# Patient Record
Sex: MALE | Race: OTHER | NOT HISPANIC OR LATINO
[De-identification: names, ages, dates, MRNs, and addresses within clinical notes are randomized per-mention and may not be internally consistent; named-entity substitution may affect disease eponyms.]

---

## 2019-04-05 ENCOUNTER — APPOINTMENT (OUTPATIENT)
Dept: ORTHOPEDIC SURGERY | Facility: CLINIC | Age: 74
End: 2019-04-05
Payer: MEDICARE

## 2019-04-05 ENCOUNTER — RECORD ABSTRACTING (OUTPATIENT)
Age: 74
End: 2019-04-05

## 2019-04-05 VITALS — BODY MASS INDEX: 25.76 KG/M2 | WEIGHT: 170 LBS | HEIGHT: 68 IN

## 2019-04-05 DIAGNOSIS — Z78.9 OTHER SPECIFIED HEALTH STATUS: ICD-10-CM

## 2019-04-05 DIAGNOSIS — Z87.39 PERSONAL HISTORY OF OTHER DISEASES OF THE MUSCULOSKELETAL SYSTEM AND CONNECTIVE TISSUE: ICD-10-CM

## 2019-04-05 DIAGNOSIS — M51.36 OTHER INTERVERTEBRAL DISC DEGENERATION, LUMBAR REGION: ICD-10-CM

## 2019-04-05 DIAGNOSIS — Z87.442 PERSONAL HISTORY OF URINARY CALCULI: ICD-10-CM

## 2019-04-05 DIAGNOSIS — M62.50 MUSCLE WASTING AND ATROPHY, NOT ELSEWHERE CLASSIFIED, UNSPECIFIED SITE: ICD-10-CM

## 2019-04-05 DIAGNOSIS — M19.91 PRIMARY OSTEOARTHRITIS, UNSPECIFIED SITE: ICD-10-CM

## 2019-04-05 DIAGNOSIS — M23.305 OTHER MENISCUS DERANGEMENTS, UNSPECIFIED MEDIAL MENISCUS, UNSPECIFIED KNEE: ICD-10-CM

## 2019-04-05 DIAGNOSIS — H91.90 UNSPECIFIED HEARING LOSS, UNSPECIFIED EAR: ICD-10-CM

## 2019-04-05 DIAGNOSIS — Z87.19 PERSONAL HISTORY OF OTHER DISEASES OF THE DIGESTIVE SYSTEM: ICD-10-CM

## 2019-04-05 DIAGNOSIS — M51.26 OTHER INTERVERTEBRAL DISC DEGENERATION, LUMBAR REGION: ICD-10-CM

## 2019-04-05 DIAGNOSIS — M22.40 CHONDROMALACIA PATELLAE, UNSPECIFIED KNEE: ICD-10-CM

## 2019-04-05 DIAGNOSIS — Z82.49 FAMILY HISTORY OF ISCHEMIC HEART DISEASE AND OTHER DISEASES OF THE CIRCULATORY SYSTEM: ICD-10-CM

## 2019-04-05 DIAGNOSIS — M75.50 BURSITIS OF UNSPECIFIED SHOULDER: ICD-10-CM

## 2019-04-05 DIAGNOSIS — M17.10 UNILATERAL PRIMARY OSTEOARTHRITIS, UNSPECIFIED KNEE: ICD-10-CM

## 2019-04-05 DIAGNOSIS — M65.30 TRIGGER FINGER, UNSPECIFIED FINGER: ICD-10-CM

## 2019-04-05 PROBLEM — Z00.00 ENCOUNTER FOR PREVENTIVE HEALTH EXAMINATION: Status: ACTIVE | Noted: 2019-04-05

## 2019-04-05 PROCEDURE — 99213 OFFICE O/P EST LOW 20 MIN: CPT | Mod: 25

## 2019-04-05 PROCEDURE — 20611 DRAIN/INJ JOINT/BURSA W/US: CPT | Mod: RT

## 2019-04-05 RX ORDER — ACETAMINOPHEN 325 MG/1
325 TABLET, FILM COATED ORAL EVERY 6 HOURS
Refills: 0 | Status: ACTIVE | COMMUNITY

## 2019-04-05 RX ORDER — HYLAN G-F 20 16MG/2ML
48 SYRINGE (ML) INTRAARTICULAR
Refills: 0 | Status: ACTIVE | COMMUNITY

## 2019-04-05 RX ORDER — HYLAN G-F 20 16MG/2ML
16 SYRINGE (ML) INTRAARTICULAR
Refills: 0 | Status: ACTIVE | COMMUNITY

## 2019-04-05 RX ORDER — ATORVASTATIN CALCIUM 80 MG/1
TABLET, FILM COATED ORAL
Refills: 0 | Status: ACTIVE | COMMUNITY

## 2019-04-05 RX ORDER — IBUPROFEN 800 MG/1
TABLET, FILM COATED ORAL
Refills: 0 | Status: ACTIVE | COMMUNITY

## 2019-04-05 RX ORDER — ALLOPURINOL 200 MG/1
TABLET ORAL
Refills: 0 | Status: ACTIVE | COMMUNITY

## 2019-04-05 RX ORDER — OMEPRAZOLE 20 MG/1
TABLET, DELAYED RELEASE ORAL
Refills: 0 | Status: ACTIVE | COMMUNITY

## 2019-04-05 RX ORDER — TRIAMCINOLONE ACETONIDE 10 MG/ML
10 INJECTION, SUSPENSION INTRA-ARTICULAR; INTRALESIONAL
Refills: 0 | Status: ACTIVE | COMMUNITY

## 2019-04-05 RX ORDER — RIVASTIGMINE 13.3 MG/24H
13.3 PATCH, EXTENDED RELEASE TRANSDERMAL
Refills: 0 | Status: ACTIVE | COMMUNITY

## 2019-05-06 ENCOUNTER — APPOINTMENT (OUTPATIENT)
Dept: ORTHOPEDIC SURGERY | Facility: CLINIC | Age: 74
End: 2019-05-06
Payer: MEDICARE

## 2019-05-06 VITALS — BODY MASS INDEX: 25.76 KG/M2 | WEIGHT: 170 LBS | HEIGHT: 68 IN

## 2019-05-06 DIAGNOSIS — M54.5 LOW BACK PAIN: ICD-10-CM

## 2019-05-06 PROCEDURE — 99214 OFFICE O/P EST MOD 30 MIN: CPT

## 2019-05-06 PROCEDURE — 72170 X-RAY EXAM OF PELVIS: CPT

## 2019-05-06 PROCEDURE — 72100 X-RAY EXAM L-S SPINE 2/3 VWS: CPT

## 2019-05-06 RX ORDER — METHYLPREDNISOLONE 4 MG/1
4 TABLET ORAL
Qty: 1 | Refills: 0 | Status: ACTIVE | COMMUNITY
Start: 2019-05-06 | End: 1900-01-01

## 2019-05-06 NOTE — DISCUSSION/SUMMARY
[de-identified] : Patient placed on Medrol Dosepak. He does not improve he will call me and we'll be referred to a pain management specialist for lumbar issues.

## 2019-05-13 ENCOUNTER — RX RENEWAL (OUTPATIENT)
Age: 74
End: 2019-05-13

## 2019-10-09 ENCOUNTER — APPOINTMENT (OUTPATIENT)
Dept: ORTHOPEDIC SURGERY | Facility: CLINIC | Age: 74
End: 2019-10-09
Payer: MEDICARE

## 2019-10-09 VITALS — BODY MASS INDEX: 25.76 KG/M2 | HEIGHT: 68 IN | WEIGHT: 170 LBS

## 2019-10-09 PROCEDURE — 99213 OFFICE O/P EST LOW 20 MIN: CPT | Mod: 25

## 2019-10-09 PROCEDURE — 20611 DRAIN/INJ JOINT/BURSA W/US: CPT | Mod: LT

## 2019-10-09 NOTE — DISCUSSION/SUMMARY
[de-identified] : POST INJECTION INSTRUCTIONS\par \par COLD THERAPY , ANALGESICS PRN\par \par HOME STRETCHING AND EXERCISES QD\par \par START P.T.  2 WEEKS AFTER INJECTION \par \par MRI IF NO RELIEF\par

## 2019-10-09 NOTE — PROCEDURE
[de-identified] : INJECTION LEFT SHOULDER SA SPACE\par \par Patient has demonstrated limited relief from NSAIDS, rest, exercises / PT, and after discussion of the risks and benefits, the patient has elected to proceed with an ULTRASOUND GUIDED injection into the LEFT SUBACROMIAL  SPACE LATERAL APPROACH \par  \par Confirmed that the patient does not have history of prior adverse reactions, active, infections, or relevant allergies. There was no effusion, erythema, or warmth, and the skin was clear\par \par The skin was sterilized with alcohol. Ethyl Chloride was used as a topical anesthetic. Routine sterile technique. \par The site was injected UTILIZING ULTRASOUND GUIDANCE to confirm appropriate placement of the needle-\par with a mixture of medication and local anesthetic. The injection was completed without complication and a bandage was applied.\par  \par The patient tolerated the procedure well and was given post-injection instructions.Rec: Cold therapy, analgesics, avoid heavy activity.\par MEDICATION: 4cc of 1% xylocaine + 10mg of KENALOG\par \par

## 2019-10-09 NOTE — PHYSICAL EXAM
[de-identified] : PHYSICAL EXAM LEFT  SHOULDER\par \par NORMAL POSTURE \par AROM 120 / 120 / 70 / 20 \par TENDER: SA REGION \par \par SPECIAL TESTING :\par FREIRE - POSITIVE \par AMPARO - POSITIVE \par SPEED TEST - POSITIVE\par \par HIDALGO - NEGATIVE \par APPREHENSION AND SUPPRESSION - NEGATIVE \par \par RC STRENGTH TESTING \par SS:  5/5\par SUB 5/5\par IS     5/5\par BICEPS  5/5\par \par SENSATION  - GROSSLY INTACT\par \par \par

## 2019-10-09 NOTE — HISTORY OF PRESENT ILLNESS
[de-identified] : LEFT SHOULDER FOLLOW UP\par GOOD RELIEF FROM INJECTION JUNE 2018 \par PAIN LEVEL 4-5/10\par CONSTANT\par ACHY\par WORSE WITH LYING DOWN, NIGHT TIME\par BETTER WITH TYLENOL\par GRINDING\par APRIL 5, 2019- CORTISONE INJECTION HELPED FOR 4-5 MONTHS

## 2019-11-18 ENCOUNTER — APPOINTMENT (OUTPATIENT)
Dept: ORTHOPEDIC SURGERY | Facility: CLINIC | Age: 74
End: 2019-11-18
Payer: MEDICARE

## 2019-11-18 VITALS — HEIGHT: 68 IN | BODY MASS INDEX: 25.76 KG/M2 | WEIGHT: 170 LBS

## 2019-11-18 PROCEDURE — 99214 OFFICE O/P EST MOD 30 MIN: CPT | Mod: 25

## 2019-11-18 PROCEDURE — 20611 DRAIN/INJ JOINT/BURSA W/US: CPT | Mod: LT

## 2019-11-18 NOTE — HISTORY OF PRESENT ILLNESS
[de-identified] : LEFT SHOULDER FOLLOW UP\par SAME AS LAST VISIT\par PAIN LEVEL 5-6/10\par WORSE WITH LYING DOWN, NIGHT TIME\par P.T. 1X WEEK FOR 5 WEEKS, NOT HELPING \par APRIL 5, 2019- CORTISONE INJECTION HELPED FOR 4-5 MONTHS\par OCTOBER 9, 2019- CORTISONE INJECTION NOT HELPFUL

## 2019-11-18 NOTE — PROCEDURE
[de-identified] : INJECTION LEFT SHOULDER SA SPACE\par \par Patient has demonstrated limited relief from NSAIDS, rest, exercises / PT, and after discussion of the risks and benefits, the patient has elected to proceed with an ULTRASOUND GUIDED injection into the LEFT SUBACROMIAL  SPACE LATERAL APPROACH \par  \par Confirmed that the patient does not have history of prior adverse reactions, active, infections, or relevant allergies. There was no effusion, erythema, or warmth, and the skin was clear\par \par The skin was sterilized with alcohol. Ethyl Chloride was used as a topical anesthetic. Routine sterile technique. \par The site was injected UTILIZING ULTRASOUND GUIDANCE to confirm appropriate placement of the needle-\par with a mixture of medication and local anesthetic. The injection was completed without complication and a bandage was applied.\par  \par The patient tolerated the procedure well and was given post-injection instructions.Rec: Cold therapy, analgesics, avoid heavy activity.\par MEDICATION: 4cc of 1% xylocaine + 40mg of KENALOG\par \par

## 2019-11-18 NOTE — DISCUSSION/SUMMARY
[de-identified] : POST INJECTION INSTRUCTIONS\par \par COLD THERAPY , ANALGESICS PRN\par \par HOME STRETCHING AND EXERCISES QD\par \par FOLLOWUP AFTER MRI\par

## 2019-11-18 NOTE — PHYSICAL EXAM
[de-identified] : PHYSICAL EXAM LEFT  SHOULDER\par \par NORMAL POSTURE \par AROM 120 / 120 / 70 / 20 \par TENDER: SA REGION \par \par SPECIAL TESTING :\par FREIRE - POSITIVE \par AMPARO - POSITIVE \par SPEED TEST - POSITIVE\par \par HIDALGO - NEGATIVE \par APPREHENSION AND SUPPRESSION - NEGATIVE \par \par RC STRENGTH TESTING \par SS:  5/5\par SUB 5/5\par IS     5/5\par BICEPS  5/5\par \par SENSATION  - GROSSLY INTACT\par \par \par

## 2020-01-15 ENCOUNTER — APPOINTMENT (OUTPATIENT)
Dept: ORTHOPEDIC SURGERY | Facility: CLINIC | Age: 75
End: 2020-01-15
Payer: MEDICARE

## 2020-01-15 VITALS — BODY MASS INDEX: 25.76 KG/M2 | WEIGHT: 170 LBS | HEIGHT: 68 IN

## 2020-01-15 DIAGNOSIS — M75.111 INCOMPLETE ROTATOR CUFF TEAR OR RUPTURE OF RIGHT SHOULDER, NOT SPECIFIED AS TRAUMATIC: ICD-10-CM

## 2020-01-15 PROCEDURE — 99213 OFFICE O/P EST LOW 20 MIN: CPT

## 2020-01-15 NOTE — PHYSICAL EXAM
[de-identified] : PHYSICAL EXAM LEFT  SHOULDER\par \par NORMAL POSTURE / SCAPULAR PROTRACTION\par AROM 130 / 130 / 70 / 20 \par TENDER: SA REGION / AC JOINT / GH JOINT / BICEPS GROOVE\par \par SPECIAL TESTING :\par FREIRE - POSITIVE \par AMPARO - POSITIVE \par SPEED TEST - POSITIVE\par \par HIDALGO - NEGATIVE \par APPREHENSION AND SUPPRESSION - NEGATIVE \par \par RC STRENGTH TESTING \par SS:  5/5\par SUB 5/5\par IS     5/5\par BICEPS  5/5\par \par SENSATION  - GROSSLY INTACT\par \par \par

## 2020-01-15 NOTE — PHYSICAL EXAM
[de-identified] : PHYSICAL EXAM LEFT  SHOULDER\par \par NORMAL POSTURE \par AROM 120 / 120 / 70 / 20 \par TENDER: SA REGION \par \par SPECIAL TESTING :\par FREIRE - POSITIVE \par AMPARO - POSITIVE \par SPEED TEST - POSITIVE\par \par HIDALGO - NEGATIVE \par APPREHENSION AND SUPPRESSION - NEGATIVE \par \par RC STRENGTH TESTING \par SS:  5/5\par SUB 5/5\par IS     5/5\par BICEPS  5/5\par \par SENSATION  - GROSSLY INTACT\par \par \par

## 2020-01-15 NOTE — DISCUSSION/SUMMARY
[de-identified] : PREOP SHOULDER SURGERY DISCUSSION:\par \par \par THERE ARE NO GUARANTEES THAT ALL SYMPTOMS WILL BE ALLEVIATED  \par SHOULDER ARTHROSCOPY, ACROMIOPLASTY, DEBRIDEMENT,  RC REPAIR AND LABRUM REPAIRS- ON AVERAGE 75- 85% SATISFACTORY RESULTS FOR TEARS < 3CM AFTER 9-12 MONTHS HEALING AND REHABILITATION. \par \par REPAIRS WILL REQUIRE STRICT SHOULDER IMMOBILIZER 4-6 WEEKS\par \par RC TEARS 3CM OR LARGER MAY REQUIRE COLLAGEN PATCH AUGMENTATION GENERALLY HAVE LESS SATISFACTORY RESULTS\par \par PHYSICAL THERAPY REQUIRED 2X WEEK FOR  MINIMUM 8-12 WEEKS FOR ALL PROCEDURES \par CONTINUED HOME EXERCISES 6-9 MONTHS AFTER THAT REQUIRED FOR OPTIMAL OUTCOMES \par \par ROUTINE SURGICAL AND ANESTHETIC RISKS INCLUDE RISK OF SURGICAL INFECTION, ANESTHETIC COMPLICATION OR ALLERGY, POSSIBLE RETEARS OR PROGRESSION OF TEAR, STIFFNESS OF SHOULDER AND UNSATISFACTORY OUTCOMES\par \par PATIENT UNDERSTANDS AND WISHES TO PROCEED\par

## 2020-01-15 NOTE — HISTORY OF PRESENT ILLNESS
[Pain Location] : pain [] : left shoulder [6] : a current pain level of 6/10 [Constant] : ~He/She~ states the symptoms seem to be constant [Lifting] : worsened by lifting [Rest] : relieved by rest [de-identified] : LEFT SHOULDER FOLLOW UP\par GOOD RELIEF FROM INJECTION JUNE 2018 \par PAIN LEVEL 6/10\par CONSTANT\par ACHY\par WORSE WITH LYING DOWN, NIGHT TIME\par BETTER WITH TYLENOL\par GRINDING\par PRIOR CORTISONE INJECTION HELPED\par NO PT

## 2020-01-15 NOTE — HISTORY OF PRESENT ILLNESS
[de-identified] : LEFT SHOULDER\par FOLLOW UP- MRI RESULTS\par CONSTANT PAIN\par PAIN LEVEL 5-6/10\par WORSE WITH LYING DOWN, NIGHT TIME\par P.T. 1X WEEK FOR 5 WEEKS\par APRIL 5, 2019- CORTISONE INJECTION HELPED FOR 4-5 MONTHS\par OCTOBER 9, 2019- CORTISONE INJECTION NOT HELPFUL\par NOVEMBER 18, /2019- CORTISONE INJECTION HELPFUL FOR 4-5 WEEKS

## 2020-01-30 ENCOUNTER — APPOINTMENT (OUTPATIENT)
Dept: ORTHOPEDIC SURGERY | Facility: CLINIC | Age: 75
End: 2020-01-30
Payer: MEDICARE

## 2020-01-30 PROCEDURE — 99214 OFFICE O/P EST MOD 30 MIN: CPT

## 2020-01-30 RX ORDER — OXYCODONE AND ACETAMINOPHEN 7.5; 325 MG/1; MG/1
7.5-325 TABLET ORAL
Qty: 42 | Refills: 0 | Status: ACTIVE | COMMUNITY
Start: 2020-01-30 | End: 1900-01-01

## 2020-01-30 NOTE — PHYSICAL EXAM
[de-identified] : PHYSICAL EXAM LEFT  SHOULDER\par \par NORMAL POSTURE \par AROM 120 / 120 / 70 / 20 \par TENDER: SA REGION \par \par SPECIAL TESTING :\par FREIRE - POSITIVE \par AMPARO - POSITIVE \par SPEED TEST - POSITIVE\par \par HIDALGO - NEGATIVE \par APPREHENSION AND SUPPRESSION - NEGATIVE \par \par RC STRENGTH TESTING \par SS:  5/5\par SUB 5/5\par IS     5/5\par BICEPS  5/5\par \par SENSATION  - GROSSLY INTACT\par \par \par

## 2020-01-30 NOTE — DISCUSSION/SUMMARY
[de-identified] : PREOP SHOULDER SURGERY DISCUSSION:\par \par \par THERE ARE NO GUARANTEES THAT ALL SYMPTOMS WILL BE ALLEVIATED  \par SHOULDER ARTHROSCOPY, ACROMIOPLASTY, DEBRIDEMENT,  RC REPAIR AND LABRUM REPAIRS- ON AVERAGE 75- 85% SATISFACTORY RESULTS FOR TEARS < 3CM AFTER 9-12 MONTHS HEALING AND REHABILITATION. \par \par REPAIRS WILL REQUIRE STRICT SHOULDER IMMOBILIZER 4-6 WEEKS\par \par RC TEARS 3CM OR LARGER MAY REQUIRE COLLAGEN PATCH AUGMENTATION GENERALLY HAVE LESS SATISFACTORY RESULTS\par \par PHYSICAL THERAPY REQUIRED 2X WEEK FOR  MINIMUM 8-12 WEEKS FOR ALL PROCEDURES \par CONTINUED HOME EXERCISES 6-9 MONTHS AFTER THAT REQUIRED FOR OPTIMAL OUTCOMES \par \par ROUTINE SURGICAL AND ANESTHETIC RISKS INCLUDE RISK OF SURGICAL INFECTION, ANESTHETIC COMPLICATION OR ALLERGY, POSSIBLE RETEARS OR PROGRESSION OF TEAR, STIFFNESS OF SHOULDER AND UNSATISFACTORY OUTCOMES\par \par PATIENT UNDERSTANDS AND WISHES TO PROCEED\par

## 2020-02-04 ENCOUNTER — APPOINTMENT (OUTPATIENT)
Dept: ORTHOPEDIC SURGERY | Facility: AMBULATORY SURGERY CENTER | Age: 75
End: 2020-02-04
Payer: MEDICARE

## 2020-02-04 PROCEDURE — 29826 SHO ARTHRS SRG DECOMPRESSION: CPT | Mod: LT

## 2020-02-04 PROCEDURE — 29823 SHO ARTHRS SRG XTNSV DBRDMT: CPT | Mod: LT,59

## 2020-02-04 PROCEDURE — 29820 SHO ARTHRS SRG PRTL SYNVCT: CPT | Mod: LT,59

## 2020-02-07 ENCOUNTER — APPOINTMENT (OUTPATIENT)
Dept: ORTHOPEDIC SURGERY | Facility: CLINIC | Age: 75
End: 2020-02-07
Payer: MEDICARE

## 2020-02-07 VITALS — HEIGHT: 68 IN | WEIGHT: 170 LBS | BODY MASS INDEX: 25.76 KG/M2

## 2020-02-07 PROCEDURE — 73030 X-RAY EXAM OF SHOULDER: CPT | Mod: LT

## 2020-02-07 PROCEDURE — 99024 POSTOP FOLLOW-UP VISIT: CPT

## 2020-02-07 RX ORDER — IBUPROFEN 800 MG/1
800 TABLET ORAL 3 TIMES DAILY
Qty: 90 | Refills: 5 | Status: ACTIVE | COMMUNITY
Start: 2020-02-07 | End: 1900-01-01

## 2020-02-07 NOTE — PHYSICAL EXAM
[de-identified] : POST OP SHOULDER EXAM \par \par PORTALS HEALING WELL - NO ERYTHEMA OR CALOR\par SUTURES REMOVED, STERISTRIPS AND WATERPROOF BANDAIDS  APPLIED \par \par AROM  pend aarom \par \par DISTAL CMS INTACT\par

## 2020-02-07 NOTE — DISCUSSION/SUMMARY
[de-identified] : POST OP REPAIR:\par FEBRUARY 4, 2020 - LEFT SUBSCAP TRANSTENDIOUS REPAIR , KAILASH, BICEPS 20%, OA\par \par COLD THERAPY,ANALGESICS AS NEEDED\par \par SHOULDER IMMOBILIZED FULL TIME X 6 WEEKS - STRICT NO AROM - DESKTOP WORK ALLOWED\par \par START PENDULUM EXERCISES 1 WEEK POSTOP\par \par START AAROM FLEXION, PULLEY  3 WEEKS POST OP\par \par FU 3 WEEKS - ADVANCE TO P.T.

## 2020-02-07 NOTE — HISTORY OF PRESENT ILLNESS
[de-identified] : LEFT SHOULDER\par POST OP\par 3 DAYS\par FEBRUARY 4, 2020 - LEFT SUBSCAP TRANSTENDIOUS REPAIR , KAILASH, BICEPS 20%, OA\par PAIN LEVEL 6-7/10\par NO NUMBNESS OR TINGLING\par GENERAL WEAKNESS AND SWELLING

## 2020-02-26 ENCOUNTER — APPOINTMENT (OUTPATIENT)
Dept: ORTHOPEDIC SURGERY | Facility: CLINIC | Age: 75
End: 2020-02-26
Payer: MEDICARE

## 2020-02-26 PROCEDURE — 99024 POSTOP FOLLOW-UP VISIT: CPT

## 2020-02-26 NOTE — HISTORY OF PRESENT ILLNESS
[de-identified] : LEFT SHOULDER\par POST OP- 3 WEEKS\par FEBRUARY 4, 2020 - LEFT SUBSCAP TRANSTENDIOUS REPAIR , KAILASH, BICEPS 20%, OA\par PAIN LEVEL 8/10\par

## 2020-02-26 NOTE — PHYSICAL EXAM
[de-identified] : POST OP SHOULDER EXAM \par \par PORTALS HEALING WELL - NO ERYTHEMA OR CALOR\par \par \par AROM  \par LEFT 120 / 120 \par \par DISTAL CMS INTACT\par

## 2020-07-08 ENCOUNTER — APPOINTMENT (OUTPATIENT)
Dept: ORTHOPEDIC SURGERY | Facility: CLINIC | Age: 75
End: 2020-07-08
Payer: MEDICARE

## 2020-07-08 VITALS — BODY MASS INDEX: 25.76 KG/M2 | HEIGHT: 68 IN | WEIGHT: 170 LBS

## 2020-07-08 VITALS — TEMPERATURE: 97.9 F

## 2020-07-08 PROCEDURE — 99213 OFFICE O/P EST LOW 20 MIN: CPT

## 2020-07-08 NOTE — PHYSICAL EXAM
[de-identified] : POST OP SHOULDER EXAM \par \par PORTALS HEALING WELL - NO ERYTHEMA OR CALOR\par \par \par AROM  \par LEFT 140 / 125 / 80 / 30 \par \par DISTAL CMS INTACT\par

## 2020-09-09 ENCOUNTER — APPOINTMENT (OUTPATIENT)
Dept: ORTHOPEDIC SURGERY | Facility: CLINIC | Age: 75
End: 2020-09-09
Payer: MEDICARE

## 2020-09-09 VITALS — TEMPERATURE: 96.3 F

## 2020-09-09 PROCEDURE — 99213 OFFICE O/P EST LOW 20 MIN: CPT

## 2020-09-09 NOTE — HISTORY OF PRESENT ILLNESS
[de-identified] : LEFT SHOULDER\par FEBRUARY 4, 2020 - LEFT SUBSCAP TRANSTENDIOUS REPAIR , KAILASH, BICEPS 20%, OA\par GENERALLY NO PAIN\par RESTARTED PT JULY (STOPPED DU TO COVID)  \par OVERALL IMPROVING\par \par

## 2020-09-09 NOTE — DISCUSSION/SUMMARY
[de-identified] : COMPLETED PT\par HOME EXERERCISES WITH BANDS AND SMALL WEIGHTS\par RETURNED TO [employer] AS TOLERATED \par \par OFFICE PRN

## 2020-09-09 NOTE — PHYSICAL EXAM
[de-identified] : POST OP SHOULDER EXAM \par \par PORTALS HEALING WELL - NO ERYTHEMA OR CALOR\par \par \par AROM  \par LEFT 150 / 150 / 90 / 30\par 5/5\par DISTAL CMS INTACT\par

## 2020-12-21 ENCOUNTER — APPOINTMENT (OUTPATIENT)
Dept: ORTHOPEDIC SURGERY | Facility: CLINIC | Age: 75
End: 2020-12-21
Payer: MEDICARE

## 2020-12-21 PROCEDURE — 73562 X-RAY EXAM OF KNEE 3: CPT | Mod: RT

## 2020-12-21 PROCEDURE — 99214 OFFICE O/P EST MOD 30 MIN: CPT | Mod: 25

## 2020-12-21 PROCEDURE — 20611 DRAIN/INJ JOINT/BURSA W/US: CPT | Mod: RT

## 2020-12-21 PROCEDURE — 99072 ADDL SUPL MATRL&STAF TM PHE: CPT

## 2020-12-21 NOTE — PHYSICAL EXAM
[de-identified] : Left knee on exam today range of motion 0-100° a small effusion mild warmth good quad tone no evidence of instability neurovascularly intact distally. [de-identified] : AP standing individual and sunrise views of the right knee were obtained showing modest diminishment of medial joint space on standing AP projection.

## 2020-12-21 NOTE — REASON FOR VISIT
[Follow-Up Visit] : a follow-up visit for [FreeTextEntry2] : RIGHT KNEE PAIN AND SWELLING - SENT FOR NEW XRAYS

## 2020-12-21 NOTE — HISTORY OF PRESENT ILLNESS
[de-identified] : He is here for a 3 week history of right knee pain. He was injury but notices pain especially with stair climbing getting a seated position. He is status post left knee replacement 5-6 years ago doing well with that.

## 2020-12-21 NOTE — PROCEDURE
[de-identified] : Patient was given a cortisone injection today this is done under sterile conditions an ultrasound guidance the lateral joint line. Patient tolerated the procedure well.

## 2020-12-21 NOTE — DISCUSSION/SUMMARY
[de-identified] : We talked about options patient also in addition to cortisone injection taking over-the-counter anti-inflammatories and ice this twice a day for the following week. If symptoms persist she will return that point may consider an MRI and before the injections.

## 2021-02-16 ENCOUNTER — NON-APPOINTMENT (OUTPATIENT)
Age: 76
End: 2021-02-16

## 2021-02-18 ENCOUNTER — NON-APPOINTMENT (OUTPATIENT)
Age: 76
End: 2021-02-18

## 2021-02-18 ENCOUNTER — APPOINTMENT (OUTPATIENT)
Dept: OPHTHALMOLOGY | Facility: CLINIC | Age: 76
End: 2021-02-18
Payer: MEDICARE

## 2021-02-18 PROCEDURE — 92004 COMPRE OPH EXAM NEW PT 1/>: CPT

## 2021-02-18 PROCEDURE — 92020 GONIOSCOPY: CPT

## 2021-02-18 PROCEDURE — 92250 FUNDUS PHOTOGRAPHY W/I&R: CPT

## 2021-02-18 PROCEDURE — 92136 OPHTHALMIC BIOMETRY: CPT

## 2021-02-21 ENCOUNTER — NON-APPOINTMENT (OUTPATIENT)
Age: 76
End: 2021-02-21

## 2021-02-22 ENCOUNTER — NON-APPOINTMENT (OUTPATIENT)
Age: 76
End: 2021-02-22

## 2021-02-22 ENCOUNTER — APPOINTMENT (OUTPATIENT)
Dept: OPHTHALMOLOGY | Facility: CLINIC | Age: 76
End: 2021-02-22
Payer: MEDICARE

## 2021-02-22 PROCEDURE — 92201 OPSCPY EXTND RTA DRAW UNI/BI: CPT

## 2021-02-22 PROCEDURE — 92014 COMPRE OPH EXAM EST PT 1/>: CPT

## 2021-02-22 PROCEDURE — 92134 CPTRZ OPH DX IMG PST SGM RTA: CPT

## 2021-03-25 ENCOUNTER — APPOINTMENT (OUTPATIENT)
Dept: OPHTHALMOLOGY | Facility: CLINIC | Age: 76
End: 2021-03-25
Payer: MEDICARE

## 2021-03-25 ENCOUNTER — NON-APPOINTMENT (OUTPATIENT)
Age: 76
End: 2021-03-25

## 2021-03-25 PROCEDURE — 92083 EXTENDED VISUAL FIELD XM: CPT

## 2021-03-25 PROCEDURE — 92133 CPTRZD OPH DX IMG PST SGM ON: CPT

## 2021-03-25 PROCEDURE — 99213 OFFICE O/P EST LOW 20 MIN: CPT

## 2021-05-03 ENCOUNTER — TRANSCRIPTION ENCOUNTER (OUTPATIENT)
Age: 76
End: 2021-05-03

## 2021-05-04 ENCOUNTER — OUTPATIENT (OUTPATIENT)
Dept: OUTPATIENT SERVICES | Facility: HOSPITAL | Age: 76
LOS: 1 days | Discharge: HOME | End: 2021-05-04
Payer: MEDICARE

## 2021-05-04 ENCOUNTER — APPOINTMENT (OUTPATIENT)
Dept: OPHTHALMOLOGY | Facility: AMBULATORY SURGERY CENTER | Age: 76
End: 2021-05-04

## 2021-05-04 DIAGNOSIS — Z41.9 ENCOUNTER FOR PROCEDURE FOR PURPOSES OTHER THAN REMEDYING HEALTH STATE, UNSPECIFIED: Chronic | ICD-10-CM

## 2021-05-04 DIAGNOSIS — Z98.89 OTHER SPECIFIED POSTPROCEDURAL STATES: Chronic | ICD-10-CM

## 2021-05-04 PROCEDURE — 66984 XCAPSL CTRC RMVL W/O ECP: CPT | Mod: LT

## 2021-05-05 ENCOUNTER — APPOINTMENT (OUTPATIENT)
Dept: OPHTHALMOLOGY | Facility: CLINIC | Age: 76
End: 2021-05-05
Payer: MEDICARE

## 2021-05-05 ENCOUNTER — NON-APPOINTMENT (OUTPATIENT)
Age: 76
End: 2021-05-05

## 2021-05-05 PROCEDURE — 99024 POSTOP FOLLOW-UP VISIT: CPT

## 2021-05-13 ENCOUNTER — APPOINTMENT (OUTPATIENT)
Dept: OPHTHALMOLOGY | Facility: CLINIC | Age: 76
End: 2021-05-13
Payer: MEDICARE

## 2021-05-13 ENCOUNTER — NON-APPOINTMENT (OUTPATIENT)
Age: 76
End: 2021-05-13

## 2021-05-13 PROCEDURE — 99024 POSTOP FOLLOW-UP VISIT: CPT

## 2021-07-15 ENCOUNTER — APPOINTMENT (OUTPATIENT)
Dept: OPHTHALMOLOGY | Facility: CLINIC | Age: 76
End: 2021-07-15
Payer: MEDICARE

## 2021-07-15 ENCOUNTER — NON-APPOINTMENT (OUTPATIENT)
Age: 76
End: 2021-07-15

## 2021-07-15 PROCEDURE — 99024 POSTOP FOLLOW-UP VISIT: CPT

## 2021-09-08 ENCOUNTER — NON-APPOINTMENT (OUTPATIENT)
Age: 76
End: 2021-09-08

## 2021-09-08 ENCOUNTER — APPOINTMENT (OUTPATIENT)
Dept: OPHTHALMOLOGY | Facility: CLINIC | Age: 76
End: 2021-09-08
Payer: MEDICARE

## 2021-09-08 PROCEDURE — 92012 INTRM OPH EXAM EST PATIENT: CPT

## 2021-09-22 ENCOUNTER — TRANSCRIPTION ENCOUNTER (OUTPATIENT)
Age: 76
End: 2021-09-22

## 2021-10-20 ENCOUNTER — APPOINTMENT (OUTPATIENT)
Dept: OPHTHALMOLOGY | Facility: CLINIC | Age: 76
End: 2021-10-20
Payer: MEDICARE

## 2021-10-20 ENCOUNTER — NON-APPOINTMENT (OUTPATIENT)
Age: 76
End: 2021-10-20

## 2021-10-20 PROCEDURE — 92083 EXTENDED VISUAL FIELD XM: CPT

## 2021-10-20 PROCEDURE — 92133 CPTRZD OPH DX IMG PST SGM ON: CPT

## 2021-10-20 PROCEDURE — 99213 OFFICE O/P EST LOW 20 MIN: CPT

## 2021-11-01 ENCOUNTER — TRANSCRIPTION ENCOUNTER (OUTPATIENT)
Age: 76
End: 2021-11-01

## 2021-11-02 ENCOUNTER — OUTPATIENT (OUTPATIENT)
Dept: OUTPATIENT SERVICES | Facility: HOSPITAL | Age: 76
LOS: 1 days | Discharge: HOME | End: 2021-11-02
Payer: MEDICARE

## 2021-11-02 ENCOUNTER — APPOINTMENT (OUTPATIENT)
Dept: OPHTHALMOLOGY | Facility: AMBULATORY SURGERY CENTER | Age: 76
End: 2021-11-02

## 2021-11-02 DIAGNOSIS — Z41.9 ENCOUNTER FOR PROCEDURE FOR PURPOSES OTHER THAN REMEDYING HEALTH STATE, UNSPECIFIED: Chronic | ICD-10-CM

## 2021-11-02 DIAGNOSIS — Z98.89 OTHER SPECIFIED POSTPROCEDURAL STATES: Chronic | ICD-10-CM

## 2021-11-02 PROCEDURE — 66984 XCAPSL CTRC RMVL W/O ECP: CPT | Mod: RT

## 2021-11-03 ENCOUNTER — APPOINTMENT (OUTPATIENT)
Dept: OPHTHALMOLOGY | Facility: CLINIC | Age: 76
End: 2021-11-03
Payer: MEDICARE

## 2021-11-03 ENCOUNTER — NON-APPOINTMENT (OUTPATIENT)
Age: 76
End: 2021-11-03

## 2021-11-03 PROCEDURE — 99024 POSTOP FOLLOW-UP VISIT: CPT

## 2021-11-11 ENCOUNTER — NON-APPOINTMENT (OUTPATIENT)
Age: 76
End: 2021-11-11

## 2021-11-11 ENCOUNTER — APPOINTMENT (OUTPATIENT)
Dept: OPHTHALMOLOGY | Facility: CLINIC | Age: 76
End: 2021-11-11
Payer: MEDICARE

## 2021-11-11 PROCEDURE — 99024 POSTOP FOLLOW-UP VISIT: CPT

## 2022-01-20 ENCOUNTER — APPOINTMENT (OUTPATIENT)
Dept: OPHTHALMOLOGY | Facility: CLINIC | Age: 77
End: 2022-01-20

## 2022-03-09 ENCOUNTER — APPOINTMENT (OUTPATIENT)
Dept: ORTHOPEDIC SURGERY | Facility: CLINIC | Age: 77
End: 2022-03-09
Payer: MEDICARE

## 2022-03-09 DIAGNOSIS — M75.22 BICIPITAL TENDINITIS, LEFT SHOULDER: ICD-10-CM

## 2022-03-09 DIAGNOSIS — M75.112 INCOMPLETE ROTATOR CUFF TEAR OR RUPTURE OF LEFT SHOULDER, NOT SPECIFIED AS TRAUMATIC: ICD-10-CM

## 2022-03-09 PROCEDURE — 20611 DRAIN/INJ JOINT/BURSA W/US: CPT | Mod: LT

## 2022-03-09 PROCEDURE — 99214 OFFICE O/P EST MOD 30 MIN: CPT | Mod: 25

## 2022-03-09 NOTE — PHYSICAL EXAM
[de-identified] : PHYSICAL EXAM LEFT  SHOULDER\par \par NORMAL POSTURE / SCAPULAR PROTRACTION\par AROM  150 / 150 / 90 / 30 \par TENDER: SA REGION ANTERIOR NEAR BICEPS GROOVE \par \par SPECIAL TESTING :\par FREIRE - POSITIVE \par AMPARO - POSITIVE \par SPEED TEST - POSITIVE\par \par HIDALGO - NEGATIVE \par APPREHENSION AND SUPPRESSION - NEGATIVE \par \par RC STRENGTH TESTING \par SS:  5/5\par SUB 5/5\par IS     5/5\par BICEPS  5/5\par \par SENSATION  - GROSSLY INTACT\par \par \par

## 2022-03-09 NOTE — HISTORY OF PRESENT ILLNESS
[de-identified] : LEFT SHOULDER\par FLARED UP TWO  YEAR AGO \par FEBRUARY 4, 2020 - LEFT SUBSCAP TRANSTENDIOUS REPAIR , KAILASH, BICEPS 20%, OA\par WAS BETTER FOR FIRST 7  MONTHS - START TO GET WORSE AGAIN 2021 \par PAIN LEVEL: 3/10 \par PT WENT TO P.T-  HELPFUL \par WORSE AT NIGHT, OVER HEAD LIFTING, LYING DOWN \par 11/18/2019- CORTISONE INJECTION \par RESTARTED PT JULY (STOPPED DU TO COVID)  \par BETTER WITH ACETAMINOPHEN AND IBUPROFEN  \par PT IS REQUESTING CORTISONE INJECTION \par \par

## 2022-03-09 NOTE — PROCEDURE
[de-identified] : INJECTION LEFT SHOULDER SA SPACE\par \par Patient has demonstrated limited relief from NSAIDS, rest, exercises / PT, and after discussion of the risks and benefits, the patient has elected to proceed with an ULTRASOUND GUIDED injection into the LEFT BICEPS GROOVE ANTERIOR DIRECT  APPROACH \par  \par Confirmed that the patient does not have history of prior adverse reactions, active, infections, or relevant allergies. There was no effusion, erythema, or warmth, and the skin was clear\par \par The skin was sterilized with alcohol. Ethyl Chloride was used as a topical anesthetic. Routine sterile technique. \par The site was injected UTILIZING ULTRASOUND GUIDANCE to confirm appropriate placement of the needle-\par with a mixture of medication and local anesthetic. The injection was completed without complication and a bandage was applied.\par  \par The patient tolerated the procedure well and was given post-injection instructions.Rec: Cold therapy, analgesics, avoid heavy activity.\par MEDICATION: 4cc of 1% xylocaine + 10mg of KENALOG\par \par

## 2022-03-09 NOTE — DISCUSSION/SUMMARY
[de-identified] : ULTRASOUND EVALUATION  REVEALS INFLAMMATORY CHANGES BICEPS GROOVE  WITHOUT SIGNIFICANT SUBSCAP  TEAR \par PATIENT HAS ELECTED TO PROCEED WITH KENALOG INJECTION SHOULDER \par RISKS AND BENEFITS DISCUSSED - VERBAL CONSENT OBTAINED \par SEE PROCEDURE NOTE\par \par \par POST INJECTION INSTRUCTIONS:\par \par INJECTION THERAPY HANDOUT PROVIDED\par \par COLD THERAPY , ANALGESICS PRN\par \par MRI LEFT SHOULDER - EVALUATE ROTATOR CUFF REPAIR \par

## 2022-03-16 ENCOUNTER — APPOINTMENT (OUTPATIENT)
Dept: ORTHOPEDIC SURGERY | Facility: CLINIC | Age: 77
End: 2022-03-16
Payer: MEDICARE

## 2022-03-16 DIAGNOSIS — M75.82 OTHER SHOULDER LESIONS, LEFT SHOULDER: ICD-10-CM

## 2022-03-16 DIAGNOSIS — M75.42 IMPINGEMENT SYNDROME OF LEFT SHOULDER: ICD-10-CM

## 2022-03-16 PROCEDURE — 99213 OFFICE O/P EST LOW 20 MIN: CPT

## 2022-03-16 NOTE — DISCUSSION/SUMMARY
[de-identified] : MRI REVEAL INTACT SUBSCAP REPAIR \par SS TENDINOSIS - POSSIBLE PARTIAL TEAR \par \par PLAN:  P.T. 3-4 WEEKS\par \par REPEAT KENALOG 40MG INJECTION 4 WEEKS\par \par CONSIDER PRP .\par \par \par \par \par \par

## 2022-03-16 NOTE — PHYSICAL EXAM
[de-identified] : 03/12/2022\par EXAM:  MRI LEFT SHOULDER WITHOUT CONTRAST\par \par IMPRESSION: \par \par \par 1. Moderate AC joint arthrosis.\par 2. Flat acromial undersurface, with small to moderate subacromial enthesophyte protruding from the anterior acromial rim. The underlying bursa is mildly thickened and inflamed.\par 3. Study negative for partial or full-thickness rotator cuff tear. There is moderate tendinosis in the supraspinatus tendon.\par 4. 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 Status post subscapularis tendon repair in the past; the tendon is presently adequately attached onto the humerus, with abundant postsurgical change at the site.\par 6. Moderate tendinosis in the proximal long biceps tendon, with a degenerative type IIb SLAP tear at its anchor point.\par 7. Mildly high riding and mildly posteriorly subluxed humeral head, mild to moderate glenohumeral arthrosis, with circumferential degeneration and fraying of the glenoid labrum.

## 2022-03-16 NOTE — HISTORY OF PRESENT ILLNESS
[de-identified] : LEFT SHOULDER\par FLARED UP TWO  YEAR AGO \par FEBRUARY 4, 2020 - LEFT SUBSCAP TRANSTENDIOUS REPAIR , KAILASH, BICEPS 20%, OA\par WAS BETTER FOR FIRST 7  MONTHS - START TO GET WORSE AGAIN 2021 \par PAIN LEVEL: 3/10 \par MARCH 9, 2022- CORTISONE INJECTION-HELPFUL \par PT WENT TO P.T-  HELPFUL \par WORSE AT NIGHT, OVER HEAD LIFTING, LYING DOWN \par 11/18/2019- CORTISONE INJECTION \par RESTARTED PT JULY (STOPPED DU TO COVID)  \par BETTER WITH ACETAMINOPHEN AND IBUPROFEN  \par MRI RESULTS TODAY \par

## 2022-12-02 NOTE — PHYSICAL EXAM
Sheath #all: Sheath: removed. [de-identified] : Patient has moderate paraspinal tenderness in the lower lumbar segments right greater than left. He has full passive internal/external rotation of the right hip with no restrictions to [de-identified] : P pelvis of both hips was taken today show no evidence of arthritis or joint space narrowing. AP and lateral lumbar spine were obtained showing a fair amount of degenerative changes in the facet joints and disc space narrowing

## 2022-12-20 NOTE — PROCEDURE
Outpatient Wound Clinician Visit Note    Chief Complaint:   Chief Complaint   Patient presents with   • Wound     LLE ulcer       Home Care Service:  No    Type of Supervision: Direct Supervision: general, nurse visit only    Was care transitioned to this department today? No   Was care transitioned from this department today?  No   Hospitalization within the last 30 days (if yes, date of discharge)? No     Arrival Disposition: [facility]  Transfer Assist: 1 person  Special Needs: Special Needs List: Emotional Patient: Worry and Anxiety    Comments:  Patient arrival information, vital signs and dressing removed by staff member noted.  Staff obtained consents, records, test results or processed orders.  Patient and Caregiver education was given regarding procedures/therapy planned.  Fall Risk screening performed.  Patient identified to be at a risk for a fall and extra precautionary measures have been taken to ensure this patient's safety.    Additional POCT Services Provided: None      Assessment:  Wound Leg Left Anterior Venous Ulcer (Active)   Date First Assessed/Time First Assessed: 02/23/21 0100   Present on Hospital Admission: Yes  Location: (c) Leg  Laterality: Left  Modifier: (c) Anterior  Level of Skin Injury: Full Thickness  Primary Wound Type: (c) Venous Ulcer      Assessments 12/12/2022 12:27 PM   Dressing Assessment Intact;Shadowing/Strikethrough   Dressing Activity Changed   Dressing Changed On   12/12/22   Wound Exudate Minimal;Serous;No odor   Cleansing Agent Normal saline;Hypochlorous acid (e.g. Vashe, Exsept)   Wound Bed/Tissue Type Pink;Epithelialized   Periwound Condition Hyperpigmented   Wound Edge Attached to wound bed   Wound Status Unchanged   Topical Agent Other (comment) (Triamcinolone to intact skin and periwound)   Wound Dressing Alginate   Wound Compression Paste wrap with calamine;Cotton roll;Other (comment) (Unna boot wrap)       Wound Ankle Left Medial Venous Ulcer (Active)   Date First  Assessed: 10/07/22   Present on Hospital Admission: Yes  Location: Ankle  Laterality: Left  Modifier: Medial  Level of Skin Injury: Partial Thickness  Primary Wound Type: Venous Ulcer  Wound Approximate Age at First Assessment (Weeks): 0.5 ...      Assessments 12/16/2022  1:15 PM   Wound Image     Dressing Assessment Intact;Drainage present   Dressing Activity Changed   Dressing Changed On   12/16/22   Wound Exudate Moderate;Serosanguineous;No odor   Cleansing Agent Normal saline;Hypochlorous acid (e.g. Vashe, Exsept)   Wound Bed/Tissue Type Pink;Necrotic tissue, slough   Periwound Condition Erythema, blanchable   Wound Edge Attached to wound bed   Wound Status Unchanged   Topical Agent Barrier cream with zinc   Wound Compression Paste wrap with calamine   Wound Last Measured 12/16/22   Wound Length (cm) 2.5 cm   Wound Width (cm) 2 cm   Wound Depth (cm) 0.1 cm   Wound Surface Area (cm^2) 5 cm^2   Wound Volume (cm^3) 0.5 cm^3   PhotoTaken? Yes   Time Out 1335   Debridement Selective - Conservative Sharp Selective - Ultrasound   Debridement Percentage (%) 25   Post-Procedure Length (cm) 2.5 cm   Post-Procedure Width (cm) 2 cm   Post-Procedure Depth (cm) 0.1 cm   Post-Procedure Surface Area (cm^2) 5 cm^2   Post-Procedure Volume (cm^3) 0.5 cm^3   Wound Bed % Granulated 35 %   Wound Bed % Epithelialized 25 %   Wound Bed % Necrotic Tissue Slough 40 %   Wound Volume Change (Initial) 0.15 cm3   Wound Volume % Change (Initial) 42.86 %   Wound Volume Change (30 days) 0.03 cm3   Wound Volume % Change (30 days) 5.26 %   Post-Procedure Volume Change (Initial) 0.4 cm3   Post-Procedure Volume % Change (Initial) 400 %   Post-Procedure Volume Change (30 days) 0.03 cm3   Post-Procedure Volume % Change (30 days) 5.26 %        Plan:  The below orders were released and performed during the visit:   Complete Wound Care  Every visit  Diagnosis: Other (specify)  Other (specify): venous ulcers  Dressing change(s) to be done by: Wound Care  Team  Dressing frequency: Two times/week (specify)  Wound location: left lower leg  Dressing change(s) to be done using: Clean Technique  Clean wound with: Normal saline  Protect periwound with: Other (specify)  Other (specify): A and D ointment  Topical agents: Barrier cream with zinc  Dressing type: Other (specify)  Other (specify): ioplex stacked x 3  Wound compression: Other (specify)  Other (specify): Unna boot to the left LE,   patien't own compression stocking to right leg  Compression for extremity: Bilateral lower legs    Order Comments:  Apply lidocaine 2% gel to ulcer bed as needed for patient comfort or predebridement.     Interventions:    Upon assessment noted that ulcer was draining through all Unna layers. Noted that this visit patient is having anxiety attack(almost every visit patient is seeing same nurse)today patient expressed her anxiety through nausea, headache,slight SOB, her toes turned blue in color(this is normal due to poor blood flow every body is a ware), her ulcer and leg burns so bad and etc. Ice pack and ice chips were provided, patient felt betted at the end of wound treatment patient expressed that she was having anxiety attack because she missed her nurse. Unna boot was applied as ordered from Miguel Sampson, per nurse assessment and judgement and extra Calcium Alginate was added to absorb the drainage.          Clinical  Procedures performed this visit:  Today's procedure is the application of a compression bandage utilizing an Unna boot to left lower extremity.I have informed the patient of the risks and benefits of this procedure, and they have had the opportunity to ask questions.Procedure was performed in a clean field.Unna boot was applied per the 's instructions.Patient tolerated procedure well and without complaints of pain or discomfort.Patient was educated regarding signs and symptoms of over compression, including unrelieved pain, toes turning red or purple, or  numbness in foot.Patient was instructed to manually remove outer layer (without scissors) and contact clinic immediately.      Departure Instruction: Simple D/C (Rx, simple instructions) and Patient Process: Simple    Departure Disposition: Depart with assistance and Home self care or family care    Follow Up  Return in about 4 days (around 12/23/2022) for Miguel Sampson.       [de-identified] : Patient has demonstrated limited relief from NSAIDS, rest, exercises / PT, and after discussion of the risks and benefits, the patient has elected to proceed with an ULTRASOUND GUIDED injection into the LEFT SUBACROMIAL  SPACE LATERAL APPROACH \par  \par Confirmed that the patient does not have history of prior adverse reactions, active, infections, or relevant allergies. There was no effusion, erythema, or warmth, and the skin was clear\par \par The skin was sterilized with alcohol. Ethyl Chloride was used as a topical anesthetic. Routine sterile technique. \par The site was injected UTILIZING ULTRASOUND GUIDANCE to confirm appropriate placement of the needle-\par with a mixture of medication and local anesthetic. The injection was completed without complication and a bandage was applied.\par  \par The patient tolerated the procedure well and was given post-injection instructions.Rec: Cold therapy, analgesics, avoid heavy activity.\par MEDICATION: 4cc of 1% xylocaine + 10mg of KENALOG\par \par  [FreeTextEntry1] : POST INJECTION INSTRUCTIONS\par \par COLD THERAPY , ANALGESICS PRN\par \par HOME STRETCHING AND EXERCISES QD\par \par START P.T.  2 WEEKS AFTER INJECTION \par \par MRI IF NO RELIEF\par